# Patient Record
(demographics unavailable — no encounter records)

---

## 2024-12-15 NOTE — PHYSICAL EXAM
[de-identified] :  Summary:   - Deangelo Esparza is a 75-year-old female with a wound on her left leg. She also reports a collection in her right leg, which may need IR drainage. Her past medical history includes arthritis, asthma, and DVT. Upon examination, the left leg wound measures two by two centimeters and has adherent dry tissue and is healed . Treatment recommendations include applying moisturizer and a Band-Aid. The patient has been instructed to coordinate with her medical doctor for the IR drainage of her right leg as there is no open wound. Follow-ups as needed were advised.

## 2025-03-11 NOTE — ASSESSMENT
[FreeTextEntry1] : Ear hygiene reviewed in detail.  Follow up recommended if symptoms persist or progresses.  Routine follow up for cerumen management suggested.  Audiometry recommended.

## 2025-03-11 NOTE — HISTORY OF PRESENT ILLNESS
[de-identified] : YAJAIRA RODRIGUEZ has a history of  76 y.o. female presents with hearing loss and left ear pain x 1 1/2 weeks.  Feels like there is water in the ear.  Denies otorrhea, tinnitus.  No recent audiogram.

## 2025-03-11 NOTE — PHYSICAL EXAM
[Normal] : temporomandibular joint is normal [FreeTextEntry1] : Microscopic ear exam with cerumen debridement:  Right ear: Obstructing cerumen was debrided from the ear canal using suction, and curet.  The ear canal was otherwise within normal limits.  The tympanic membrane was intact and noninflamed.  Left ear: Obstructing cerumen was debrided from the ear canal using suction, and curets.  The ear canal was otherwise within normal limits.  The tympanic membrane was intact and noninflamed.